# Patient Record
Sex: FEMALE | Race: WHITE | NOT HISPANIC OR LATINO | URBAN - METROPOLITAN AREA
[De-identification: names, ages, dates, MRNs, and addresses within clinical notes are randomized per-mention and may not be internally consistent; named-entity substitution may affect disease eponyms.]

---

## 2017-01-28 ENCOUNTER — EMERGENCY (EMERGENCY)
Facility: HOSPITAL | Age: 82
LOS: 1 days | End: 2017-01-28
Admitting: EMERGENCY MEDICINE
Payer: MEDICARE

## 2017-01-28 DIAGNOSIS — Z98.89 OTHER SPECIFIED POSTPROCEDURAL STATES: Chronic | ICD-10-CM

## 2017-01-28 PROCEDURE — 72110 X-RAY EXAM L-2 SPINE 4/>VWS: CPT | Mod: 26

## 2017-01-28 PROCEDURE — 70450 CT HEAD/BRAIN W/O DYE: CPT

## 2017-01-28 PROCEDURE — 99284 EMERGENCY DEPT VISIT MOD MDM: CPT

## 2017-01-28 PROCEDURE — 72110 X-RAY EXAM L-2 SPINE 4/>VWS: CPT

## 2017-01-28 PROCEDURE — 70450 CT HEAD/BRAIN W/O DYE: CPT | Mod: 26

## 2017-01-28 PROCEDURE — 99284 EMERGENCY DEPT VISIT MOD MDM: CPT | Mod: 25

## 2017-04-10 ENCOUNTER — INPATIENT (INPATIENT)
Facility: HOSPITAL | Age: 82
LOS: 2 days | Discharge: NURSING, REHAB OR LONG-TERM CARE | DRG: 535 | End: 2017-04-13
Attending: INTERNAL MEDICINE | Admitting: INTERNAL MEDICINE
Payer: MEDICARE

## 2017-04-10 VITALS
RESPIRATION RATE: 18 BRPM | TEMPERATURE: 98 F | DIASTOLIC BLOOD PRESSURE: 73 MMHG | OXYGEN SATURATION: 94 % | SYSTOLIC BLOOD PRESSURE: 168 MMHG | WEIGHT: 110.01 LBS | HEART RATE: 76 BPM | HEIGHT: 60 IN

## 2017-04-10 DIAGNOSIS — Z98.89 OTHER SPECIFIED POSTPROCEDURAL STATES: Chronic | ICD-10-CM

## 2017-04-10 DIAGNOSIS — S32.9XXA FRACTURE OF UNSPECIFIED PARTS OF LUMBOSACRAL SPINE AND PELVIS, INITIAL ENCOUNTER FOR CLOSED FRACTURE: ICD-10-CM

## 2017-04-10 DIAGNOSIS — Z86.39 PERSONAL HISTORY OF OTHER ENDOCRINE, NUTRITIONAL AND METABOLIC DISEASE: ICD-10-CM

## 2017-04-10 DIAGNOSIS — I10 ESSENTIAL (PRIMARY) HYPERTENSION: ICD-10-CM

## 2017-04-10 DIAGNOSIS — G30.0 ALZHEIMER'S DISEASE WITH EARLY ONSET: ICD-10-CM

## 2017-04-10 DIAGNOSIS — I47.1 SUPRAVENTRICULAR TACHYCARDIA: ICD-10-CM

## 2017-04-10 LAB
ALBUMIN SERPL ELPH-MCNC: 2.8 G/DL — LOW (ref 3.3–5)
ALP SERPL-CCNC: 76 U/L — SIGNIFICANT CHANGE UP (ref 30–120)
ALT FLD-CCNC: 18 U/L DA — SIGNIFICANT CHANGE UP (ref 10–60)
ANION GAP SERPL CALC-SCNC: 7 MMOL/L — SIGNIFICANT CHANGE UP (ref 5–17)
APPEARANCE UR: CLEAR — SIGNIFICANT CHANGE UP
APTT BLD: 31.8 SEC — SIGNIFICANT CHANGE UP (ref 27.5–37.4)
AST SERPL-CCNC: 20 U/L — SIGNIFICANT CHANGE UP (ref 10–40)
BASOPHILS # BLD AUTO: 0.1 K/UL — SIGNIFICANT CHANGE UP (ref 0–0.2)
BASOPHILS NFR BLD AUTO: 2.6 % — HIGH (ref 0–2)
BILIRUB SERPL-MCNC: 0.7 MG/DL — SIGNIFICANT CHANGE UP (ref 0.2–1.2)
BILIRUB UR-MCNC: ABNORMAL
BUN SERPL-MCNC: 21 MG/DL — SIGNIFICANT CHANGE UP (ref 7–23)
CALCIUM SERPL-MCNC: 8.8 MG/DL — SIGNIFICANT CHANGE UP (ref 8.4–10.5)
CHLORIDE SERPL-SCNC: 103 MMOL/L — SIGNIFICANT CHANGE UP (ref 96–108)
CO2 SERPL-SCNC: 28 MMOL/L — SIGNIFICANT CHANGE UP (ref 22–31)
COLOR SPEC: YELLOW — SIGNIFICANT CHANGE UP
CREAT SERPL-MCNC: 0.91 MG/DL — SIGNIFICANT CHANGE UP (ref 0.5–1.3)
DIFF PNL FLD: ABNORMAL
EOSINOPHIL # BLD AUTO: 0.2 K/UL — SIGNIFICANT CHANGE UP (ref 0–0.5)
EOSINOPHIL NFR BLD AUTO: 3.4 % — SIGNIFICANT CHANGE UP (ref 0–6)
GLUCOSE SERPL-MCNC: 145 MG/DL — HIGH (ref 70–99)
GLUCOSE UR QL: NEGATIVE MG/DL — SIGNIFICANT CHANGE UP
HCT VFR BLD CALC: 39.6 % — SIGNIFICANT CHANGE UP (ref 34.5–45)
HGB BLD-MCNC: 13.4 G/DL — SIGNIFICANT CHANGE UP (ref 11.5–15.5)
INR BLD: 1.17 RATIO — HIGH (ref 0.88–1.16)
KETONES UR-MCNC: NEGATIVE — SIGNIFICANT CHANGE UP
LEUKOCYTE ESTERASE UR-ACNC: ABNORMAL
LYMPHOCYTES # BLD AUTO: 0.5 K/UL — LOW (ref 1–3.3)
LYMPHOCYTES # BLD AUTO: 11.4 % — LOW (ref 13–44)
MCHC RBC-ENTMCNC: 33.7 PG — SIGNIFICANT CHANGE UP (ref 27–34)
MCHC RBC-ENTMCNC: 33.8 GM/DL — SIGNIFICANT CHANGE UP (ref 32–36)
MCV RBC AUTO: 99.6 FL — SIGNIFICANT CHANGE UP (ref 80–100)
MONOCYTES # BLD AUTO: 0.8 K/UL — SIGNIFICANT CHANGE UP (ref 0–0.9)
MONOCYTES NFR BLD AUTO: 16.6 % — HIGH (ref 2–14)
NEUTROPHILS # BLD AUTO: 3.1 K/UL — SIGNIFICANT CHANGE UP (ref 1.8–7.4)
NEUTROPHILS NFR BLD AUTO: 65.9 % — SIGNIFICANT CHANGE UP (ref 43–77)
NITRITE UR-MCNC: NEGATIVE — SIGNIFICANT CHANGE UP
PH UR: 5 — SIGNIFICANT CHANGE UP (ref 4.8–8)
PLATELET # BLD AUTO: 177 K/UL — SIGNIFICANT CHANGE UP (ref 150–400)
POTASSIUM SERPL-MCNC: 4.7 MMOL/L — SIGNIFICANT CHANGE UP (ref 3.5–5.3)
POTASSIUM SERPL-SCNC: 4.7 MMOL/L — SIGNIFICANT CHANGE UP (ref 3.5–5.3)
PROT SERPL-MCNC: 6.6 G/DL — SIGNIFICANT CHANGE UP (ref 6–8.3)
PROT UR-MCNC: 30 MG/DL
PROTHROM AB SERPL-ACNC: 12.8 SEC — HIGH (ref 9.8–12.7)
RBC # BLD: 3.98 M/UL — SIGNIFICANT CHANGE UP (ref 3.8–5.2)
RBC # FLD: 12.5 % — SIGNIFICANT CHANGE UP (ref 10.3–14.5)
SODIUM SERPL-SCNC: 138 MMOL/L — SIGNIFICANT CHANGE UP (ref 135–145)
SP GR SPEC: 1.02 — SIGNIFICANT CHANGE UP (ref 1.01–1.02)
T3 SERPL-MCNC: 88 NG/DL — SIGNIFICANT CHANGE UP (ref 80–200)
T4 AB SER-ACNC: 7.2 UG/DL — SIGNIFICANT CHANGE UP (ref 4.6–12)
TSH SERPL-MCNC: 1.03 UIU/ML — SIGNIFICANT CHANGE UP (ref 0.27–4.2)
UROBILINOGEN FLD QL: 1 MG/DL
WBC # BLD: 4.7 K/UL — SIGNIFICANT CHANGE UP (ref 3.8–10.5)
WBC # FLD AUTO: 4.7 K/UL — SIGNIFICANT CHANGE UP (ref 3.8–10.5)

## 2017-04-10 PROCEDURE — 93010 ELECTROCARDIOGRAM REPORT: CPT

## 2017-04-10 PROCEDURE — 70450 CT HEAD/BRAIN W/O DYE: CPT | Mod: 26

## 2017-04-10 PROCEDURE — 99284 EMERGENCY DEPT VISIT MOD MDM: CPT

## 2017-04-10 PROCEDURE — 71010: CPT | Mod: 26

## 2017-04-10 PROCEDURE — 73502 X-RAY EXAM HIP UNI 2-3 VIEWS: CPT | Mod: 26,LT

## 2017-04-10 PROCEDURE — 72170 X-RAY EXAM OF PELVIS: CPT | Mod: 26,59

## 2017-04-10 RX ORDER — LEVOTHYROXINE SODIUM 125 MCG
50 TABLET ORAL DAILY
Qty: 0 | Refills: 0 | Status: DISCONTINUED | OUTPATIENT
Start: 2017-04-10 | End: 2017-04-13

## 2017-04-10 RX ORDER — VALSARTAN 80 MG/1
80 TABLET ORAL DAILY
Qty: 0 | Refills: 0 | Status: DISCONTINUED | OUTPATIENT
Start: 2017-04-10 | End: 2017-04-13

## 2017-04-10 RX ORDER — ACETAMINOPHEN 500 MG
650 TABLET ORAL EVERY 6 HOURS
Qty: 0 | Refills: 0 | Status: DISCONTINUED | OUTPATIENT
Start: 2017-04-10 | End: 2017-04-13

## 2017-04-10 RX ORDER — METOPROLOL TARTRATE 50 MG
12.5 TABLET ORAL
Qty: 0 | Refills: 0 | Status: DISCONTINUED | OUTPATIENT
Start: 2017-04-10 | End: 2017-04-13

## 2017-04-10 RX ORDER — ESCITALOPRAM OXALATE 10 MG/1
10 TABLET, FILM COATED ORAL DAILY
Qty: 0 | Refills: 0 | Status: DISCONTINUED | OUTPATIENT
Start: 2017-04-10 | End: 2017-04-13

## 2017-04-10 RX ORDER — ALBUTEROL 90 UG/1
2.5 AEROSOL, METERED ORAL ONCE
Qty: 0 | Refills: 0 | Status: COMPLETED | OUTPATIENT
Start: 2017-04-10 | End: 2017-04-10

## 2017-04-10 RX ORDER — ASPIRIN/CALCIUM CARB/MAGNESIUM 324 MG
81 TABLET ORAL DAILY
Qty: 0 | Refills: 0 | Status: DISCONTINUED | OUTPATIENT
Start: 2017-04-10 | End: 2017-04-13

## 2017-04-10 RX ORDER — MORPHINE SULFATE 50 MG/1
2 CAPSULE, EXTENDED RELEASE ORAL EVERY 6 HOURS
Qty: 0 | Refills: 0 | Status: DISCONTINUED | OUTPATIENT
Start: 2017-04-10 | End: 2017-04-13

## 2017-04-10 RX ORDER — ENOXAPARIN SODIUM 100 MG/ML
30 INJECTION SUBCUTANEOUS DAILY
Qty: 0 | Refills: 0 | Status: DISCONTINUED | OUTPATIENT
Start: 2017-04-10 | End: 2017-04-13

## 2017-04-10 RX ORDER — SODIUM CHLORIDE 9 MG/ML
3 INJECTION INTRAMUSCULAR; INTRAVENOUS; SUBCUTANEOUS EVERY 8 HOURS
Qty: 0 | Refills: 0 | Status: DISCONTINUED | OUTPATIENT
Start: 2017-04-10 | End: 2017-04-13

## 2017-04-10 RX ADMIN — SODIUM CHLORIDE 3 MILLILITER(S): 9 INJECTION INTRAMUSCULAR; INTRAVENOUS; SUBCUTANEOUS at 22:24

## 2017-04-10 RX ADMIN — ALBUTEROL 2.5 MILLIGRAM(S): 90 AEROSOL, METERED ORAL at 22:47

## 2017-04-10 RX ADMIN — ESCITALOPRAM OXALATE 10 MILLIGRAM(S): 10 TABLET, FILM COATED ORAL at 15:10

## 2017-04-10 RX ADMIN — Medication 1 TABLET(S): at 15:11

## 2017-04-10 RX ADMIN — Medication 12.5 MILLIGRAM(S): at 17:49

## 2017-04-10 RX ADMIN — ENOXAPARIN SODIUM 30 MILLIGRAM(S): 100 INJECTION SUBCUTANEOUS at 17:48

## 2017-04-10 RX ADMIN — Medication 650 MILLIGRAM(S): at 15:22

## 2017-04-10 RX ADMIN — SODIUM CHLORIDE 3 MILLILITER(S): 9 INJECTION INTRAMUSCULAR; INTRAVENOUS; SUBCUTANEOUS at 15:26

## 2017-04-10 RX ADMIN — Medication 50 MICROGRAM(S): at 15:11

## 2017-04-10 RX ADMIN — VALSARTAN 80 MILLIGRAM(S): 80 TABLET ORAL at 15:18

## 2017-04-10 RX ADMIN — Medication 650 MILLIGRAM(S): at 16:20

## 2017-04-10 RX ADMIN — Medication 81 MILLIGRAM(S): at 15:10

## 2017-04-10 NOTE — ED ADULT NURSE REASSESSMENT NOTE - NS ED NURSE REASSESS COMMENT FT1
11am- Pt resting quietly on stretcher. Family bedside. Admitted to Dr Grimaldo.    1130 -Dr Grimaldo in attendance.

## 2017-04-10 NOTE — CONSULT NOTE ADULT - ASSESSMENT
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    Plan:  - Continue metoprolol 12.5 mg bid for SVT  - Continue valsartan 80 mg daily for HTN  - Ortho evaluation  - The family had previously declined intervention for her aortic stenosis. She is currently asymptomatic from an aortic stenosis standpoint. If orthopedic surgery required, the patient is at high risk for cardiac issues. However, no further cardiac testing indicated at this time.

## 2017-04-10 NOTE — ED PROVIDER NOTE - CARE PLAN
Principal Discharge DX:	Closed nondisplaced fracture of pelvis, unspecified part of pelvis, initial encounter

## 2017-04-10 NOTE — ED PROVIDER NOTE - PMH
Acquired hypothyroidism    Alzheimer's disease, early onset    chronic leukopenia    Dementia    Gastritis    History of hypothyroidism    Hypertension    Hypertension    IBS (irritable bowel syndrome)    Murmur, cardiac    Nerve pain    PAD (peripheral artery disease)

## 2017-04-10 NOTE — ED PROVIDER NOTE - MEDICAL DECISION MAKING DETAILS
Evaluate the severity and injury from the fall by ordering imaging studies then corollate clinically

## 2017-04-10 NOTE — H&P ADULT - ASSESSMENT
89yoF h/o mod dementia, AS, HTN, SVT, recurrent falls,rec UTI, with syncope with SVT, was advised PPM on earlier admission and family refused , hyperthyroidism , L2 compression fracture.  Patient lives on her own, ambulates with walker, independent with ADLs and with great support from her daughter and 24 hrs AID   .  patient fell yesterday and having hip pain, no syncope no seizure had vomiting twice last evening,had hip pain and was unable to ambulate so brought in to [facility] ED and found to have HIP fracture and is being admitted for further care and ortho consult, called and awaiting f up.   no bowel, or urinary complaint

## 2017-04-10 NOTE — H&P ADULT - NEUROLOGICAL DETAILS
sensation intact/responds to verbal commands/responds to pain/deep reflexes intact/cranial nerves intact

## 2017-04-10 NOTE — CONSULT NOTE ADULT - SUBJECTIVE AND OBJECTIVE BOX
History of Present Illness: The patient is a 89 year old female with a history of HTN, low-flow low-gradient severe aortic stenosis with preserved EF, dementia, prior SVT, hypothyroidism who presents after fall. History obtained from family member. The patient was walking with her walker when she had a mechanical fall. There was no loss of consciousness, dizziness, palpitations, chest pain, shortness of breath. She was noted to have a hip fracture in the ED.    Past Medical/Surgical History: HTN, aortic stenosis, dementia, prior SVT, hyperthyroidism    Medications:    Family History: Non-contributory family history of premature cardiovascular atherosclerotic disease    Social History: No tobacco, alcohol or drug use    Review of Systems:  General: No fevers, chills, weight loss or gain  Skin: No rashes, color changes  Cardiovascular: No chest pain, orthopnea  Respiratory: No shortness of breath, cough  Gastrointestinal: No nausea, abdominal pain  Genitourinary: No incontinence, pain with urination  Musculoskeletal: No pain, swelling, decreased range of motion  Neurological: No headache, weakness  Psychiatric: No depression, anxiety  Endocrine: No weight loss or gain, increased thirst  All other systems are comprehensively negative.    Physical Exam:  Vitals:        Vital Signs Last 24 Hrs  T(C): 36.3, Max: 36.7 (04-10 @ 09:03)  T(F): 97.3, Max: 98 (04-10 @ 09:03)  HR: 75 (71 - 76)  BP: 124/86 (110/61 - 168/73)  BP(mean): --  RR: 16 (16 - 18)  SpO2: 95% (94% - 98%)  General: NAD  Neck: No JVD, no carotid bruit  Lungs: CTAB  CV: RRR, nl S1/S2, 2/6 systolic murmur  Abdomen: S/NT/ND, +BS  Extremities: No LE edema, no cyanosis    Labs:                        13.4   4.7   )-----------( 177      ( 10 Apr 2017 10:04 )             39.6     04-10    138  |  103  |  21  ----------------------------<  145<H>  4.7   |  28  |  0.91    Ca    8.8      10 Apr 2017 10:04    TPro  6.6  /  Alb  2.8<L>  /  TBili  0.7  /  DBili  x   /  AST  20  /  ALT  18  /  AlkPhos  76  04-10        PT/INR - ( 10 Apr 2017 10:04 )   PT: 12.8 sec;   INR: 1.17 ratio         PTT - ( 10 Apr 2017 10:04 )  PTT:31.8 sec    ECG: History of Present Illness: The patient is a 89 year old female with a history of HTN, low-flow low-gradient severe aortic stenosis with preserved EF, dementia, prior SVT, hypothyroidism who presents after fall. History obtained from family member. The patient was walking with her walker when she had a mechanical fall. There was no loss of consciousness, dizziness, palpitations, chest pain, shortness of breath. She was noted to have a hip fracture in the ED.    Past Medical/Surgical History: HTN, aortic stenosis, dementia, prior SVT, hyperthyroidism    Medications:  MEDICATIONS  (STANDING):  sodium chloride 0.9% lock flush 3milliLiter(s) IV Push every 8 hours  enoxaparin Injectable 30milliGRAM(s) SubCutaneous daily  aspirin  chewable 81milliGRAM(s) Oral daily  valsartan 80milliGRAM(s) Oral daily  escitalopram 10milliGRAM(s) Oral daily  metoprolol 12.5milliGRAM(s) Oral two times a day  levothyroxine 50MICROGram(s) Oral daily  calcium carbonate  625 mG + Vitamin D (OsCal 250 + D) 1Tablet(s) Oral daily    MEDICATIONS  (PRN):  acetaminophen   Tablet. 650milliGRAM(s) Oral every 6 hours PRN Moderate Pain (4 - 6)  morphine  - Injectable 2milliGRAM(s) IV Push every 6 hours PRN Severe Pain (7 - 10)    Family History: Non-contributory family history of premature cardiovascular atherosclerotic disease    Social History: No tobacco, alcohol or drug use    Review of Systems:  General: No fevers, chills, weight loss or gain  Skin: No rashes, color changes  Cardiovascular: No chest pain, orthopnea  Respiratory: No shortness of breath, cough  Gastrointestinal: No nausea, abdominal pain  Genitourinary: No incontinence, pain with urination  Musculoskeletal: No pain, swelling, decreased range of motion  Neurological: No headache, weakness  Psychiatric: No depression, anxiety  Endocrine: No weight loss or gain, increased thirst  All other systems are comprehensively negative.    Physical Exam:  Vitals:        Vital Signs Last 24 Hrs  T(C): 36.3, Max: 36.7 (04-10 @ 09:03)  T(F): 97.3, Max: 98 (04-10 @ 09:03)  HR: 75 (71 - 76)  BP: 124/86 (110/61 - 168/73)  BP(mean): --  RR: 16 (16 - 18)  SpO2: 95% (94% - 98%)  General: NAD  Neck: No JVD, no carotid bruit  Lungs: CTAB  CV: RRR, nl S1/S2, 2/6 systolic murmur  Abdomen: S/NT/ND, +BS  Extremities: No LE edema, no cyanosis    Labs:                        13.4   4.7   )-----------( 177      ( 10 Apr 2017 10:04 )             39.6     04-10    138  |  103  |  21  ----------------------------<  145<H>  4.7   |  28  |  0.91    Ca    8.8      10 Apr 2017 10:04    TPro  6.6  /  Alb  2.8<L>  /  TBili  0.7  /  DBili  x   /  AST  20  /  ALT  18  /  AlkPhos  76  04-10        PT/INR - ( 10 Apr 2017 10:04 )   PT: 12.8 sec;   INR: 1.17 ratio         PTT - ( 10 Apr 2017 10:04 )  PTT:31.8 sec    ECG: NSR, normal axis, LVH with secondary repol abnormality

## 2017-04-10 NOTE — H&P ADULT - HISTORY OF PRESENT ILLNESS
89yoF hx  mod dementia, AS, HTN, SVT, recurrent falls,rec UTI, with syncope with SVT, was advised PPM on earlier admission and family refused , hyperthyroidism , L2 compression fracture.  Patient lives on her own, ambulates with walker, independent with ADLs and with great support from her daughter and 24 hrs AID   .  patient fell yesterday and having hip pain, no syncope no seizure had vomiting twice last evening,had hip pain and was unable to ambulate so brought in to o ED and found to have HIP fracture and is being admitted for further care and ortho consult, called and awaiting f up.   no bowel, or urinary complaint

## 2017-04-10 NOTE — H&P ADULT - ATTENDING COMMENTS
I have discussed care plan with patient and HCP,expressed understanding of problems treatment and their effect and side effects, alternatives in detail,I have asked if they have any questions and concerns and appropriately addressed them to best of my ability  Reviewed all diagonostic tests, lab results and drug drug interactions, and medications  120 minutes spent on this visit, 50% visit time spent in care co-ordination with other attendings , family, and counselling patient

## 2017-04-10 NOTE — PATIENT PROFILE ADULT. - NS PRO AD PATIENT TYPE
Medical Orders for Life-Sustaining Treatment (MOLST) Health Care Proxy (HCP)/Medical Orders for Life-Sustaining Treatment (MOLST)

## 2017-04-10 NOTE — PATIENT PROFILE ADULT. - NS PRO AD PATIENT TYPE ON CHART
Medical Orders for Life-Sustaining Treatment (MOLST) Health Care Proxy (HCP)/Medical Orders for Life-Sustaining Treatment (MOLST)/Do Not Resuscitate (DNR)

## 2017-04-10 NOTE — ED PROVIDER NOTE - PSH
Cataract of right eye    H/O inguinal hernia repair    H/O umbilical hernia repair  six years ago  H/O umbilical hernia repair

## 2017-04-10 NOTE — ED PROVIDER NOTE - OBJECTIVE STATEMENT
Patient came in by ambulance from home apparently fell on the floor yesterday care taker put her to bed decided to call ambulance this morning for evaluation. Paramedic apparently found patient in bed with some hip discomfort. No history of lost of consciousness.

## 2017-04-10 NOTE — H&P ADULT - PMH
Acquired hypothyroidism    Alzheimer's disease, early onset    chronic leukopenia    Compression fracture of L2    Dementia    Gastritis    History of hypothyroidism    Hypertension    Hypertension    IBS (irritable bowel syndrome)    Murmur, cardiac    Nerve pain    PAD (peripheral artery disease)    SVT (supraventricular tachycardia)

## 2017-04-11 LAB
ANION GAP SERPL CALC-SCNC: 5 MMOL/L — SIGNIFICANT CHANGE UP (ref 5–17)
BUN SERPL-MCNC: 29 MG/DL — HIGH (ref 7–23)
CALCIUM SERPL-MCNC: 9.2 MG/DL — SIGNIFICANT CHANGE UP (ref 8.4–10.5)
CHLORIDE SERPL-SCNC: 103 MMOL/L — SIGNIFICANT CHANGE UP (ref 96–108)
CHOLEST SERPL-MCNC: 138 MG/DL — SIGNIFICANT CHANGE UP (ref 10–199)
CO2 SERPL-SCNC: 30 MMOL/L — SIGNIFICANT CHANGE UP (ref 22–31)
CREAT SERPL-MCNC: 1.18 MG/DL — SIGNIFICANT CHANGE UP (ref 0.5–1.3)
GLUCOSE SERPL-MCNC: 147 MG/DL — HIGH (ref 70–99)
HCT VFR BLD CALC: 34.7 % — SIGNIFICANT CHANGE UP (ref 34.5–45)
HDLC SERPL-MCNC: 27 MG/DL — LOW (ref 40–125)
HGB BLD-MCNC: 11.1 G/DL — LOW (ref 11.5–15.5)
LIPID PNL WITH DIRECT LDL SERPL: 83 MG/DL — SIGNIFICANT CHANGE UP
MCHC RBC-ENTMCNC: 31.9 GM/DL — LOW (ref 32–36)
MCHC RBC-ENTMCNC: 32.2 PG — SIGNIFICANT CHANGE UP (ref 27–34)
MCV RBC AUTO: 101.1 FL — HIGH (ref 80–100)
PLATELET # BLD AUTO: 134 K/UL — LOW (ref 150–400)
POTASSIUM SERPL-MCNC: 4.8 MMOL/L — SIGNIFICANT CHANGE UP (ref 3.5–5.3)
POTASSIUM SERPL-SCNC: 4.8 MMOL/L — SIGNIFICANT CHANGE UP (ref 3.5–5.3)
RBC # BLD: 3.44 M/UL — LOW (ref 3.8–5.2)
RBC # FLD: 12.6 % — SIGNIFICANT CHANGE UP (ref 10.3–14.5)
SODIUM SERPL-SCNC: 138 MMOL/L — SIGNIFICANT CHANGE UP (ref 135–145)
TOTAL CHOLESTEROL/HDL RATIO MEASUREMENT: 5.1 RATIO — SIGNIFICANT CHANGE UP (ref 3.3–7.1)
TRIGL SERPL-MCNC: 140 MG/DL — SIGNIFICANT CHANGE UP (ref 10–149)
WBC # BLD: 2.8 K/UL — LOW (ref 3.8–10.5)
WBC # FLD AUTO: 2.8 K/UL — LOW (ref 3.8–10.5)

## 2017-04-11 PROCEDURE — 99222 1ST HOSP IP/OBS MODERATE 55: CPT | Mod: 24

## 2017-04-11 PROCEDURE — 27197 CLSD TX PELVIC RING FX: CPT | Mod: LT

## 2017-04-11 RX ORDER — LIDOCAINE 4 G/100G
1 CREAM TOPICAL DAILY
Qty: 0 | Refills: 0 | Status: DISCONTINUED | OUTPATIENT
Start: 2017-04-11 | End: 2017-04-13

## 2017-04-11 RX ORDER — ALBUTEROL 90 UG/1
1 AEROSOL, METERED ORAL EVERY 4 HOURS
Qty: 0 | Refills: 0 | Status: DISCONTINUED | OUTPATIENT
Start: 2017-04-11 | End: 2017-04-13

## 2017-04-11 RX ORDER — IPRATROPIUM/ALBUTEROL SULFATE 18-103MCG
3 AEROSOL WITH ADAPTER (GRAM) INHALATION EVERY 6 HOURS
Qty: 0 | Refills: 0 | Status: DISCONTINUED | OUTPATIENT
Start: 2017-04-11 | End: 2017-04-13

## 2017-04-11 RX ORDER — TIOTROPIUM BROMIDE 18 UG/1
1 CAPSULE ORAL; RESPIRATORY (INHALATION) DAILY
Qty: 0 | Refills: 0 | Status: DISCONTINUED | OUTPATIENT
Start: 2017-04-11 | End: 2017-04-13

## 2017-04-11 RX ORDER — SODIUM CHLORIDE 9 MG/ML
1000 INJECTION, SOLUTION INTRAVENOUS
Qty: 0 | Refills: 0 | Status: DISCONTINUED | OUTPATIENT
Start: 2017-04-11 | End: 2017-04-12

## 2017-04-11 RX ADMIN — Medication 50 MICROGRAM(S): at 06:13

## 2017-04-11 RX ADMIN — Medication 3 MILLILITER(S): at 19:24

## 2017-04-11 RX ADMIN — ENOXAPARIN SODIUM 30 MILLIGRAM(S): 100 INJECTION SUBCUTANEOUS at 12:46

## 2017-04-11 RX ADMIN — Medication 650 MILLIGRAM(S): at 18:36

## 2017-04-11 RX ADMIN — VALSARTAN 80 MILLIGRAM(S): 80 TABLET ORAL at 06:13

## 2017-04-11 RX ADMIN — Medication 3 MILLILITER(S): at 12:36

## 2017-04-11 RX ADMIN — LIDOCAINE 1 PATCH: 4 CREAM TOPICAL at 12:47

## 2017-04-11 RX ADMIN — SODIUM CHLORIDE 75 MILLILITER(S): 9 INJECTION, SOLUTION INTRAVENOUS at 12:43

## 2017-04-11 RX ADMIN — SODIUM CHLORIDE 3 MILLILITER(S): 9 INJECTION INTRAMUSCULAR; INTRAVENOUS; SUBCUTANEOUS at 12:47

## 2017-04-11 RX ADMIN — Medication 1 TABLET(S): at 12:46

## 2017-04-11 RX ADMIN — Medication 12.5 MILLIGRAM(S): at 18:36

## 2017-04-11 RX ADMIN — SODIUM CHLORIDE 3 MILLILITER(S): 9 INJECTION INTRAMUSCULAR; INTRAVENOUS; SUBCUTANEOUS at 06:13

## 2017-04-11 RX ADMIN — Medication 12.5 MILLIGRAM(S): at 06:13

## 2017-04-11 RX ADMIN — SODIUM CHLORIDE 3 MILLILITER(S): 9 INJECTION INTRAMUSCULAR; INTRAVENOUS; SUBCUTANEOUS at 21:11

## 2017-04-11 RX ADMIN — Medication 650 MILLIGRAM(S): at 10:13

## 2017-04-11 RX ADMIN — Medication 81 MILLIGRAM(S): at 12:46

## 2017-04-11 RX ADMIN — ESCITALOPRAM OXALATE 10 MILLIGRAM(S): 10 TABLET, FILM COATED ORAL at 12:47

## 2017-04-11 RX ADMIN — SODIUM CHLORIDE 75 MILLILITER(S): 9 INJECTION, SOLUTION INTRAVENOUS at 21:14

## 2017-04-11 RX ADMIN — Medication 650 MILLIGRAM(S): at 11:00

## 2017-04-11 NOTE — DIETITIAN INITIAL EVALUATION ADULT. - OTHER INFO
89F adm s/p fall, found to have pelvic fx. Pt's granddaughter and daughter at bedside, appear very involved c pt care. Pt noted to live alone but has 24hr aide. Pt presently on [facility] soft diet c adequate po intake per granddaughter. SLP consult pending as pt c hx of dysphagia per family. Granddaughter states that pt sometimes will cough at meals as she sometimes slouches down when eating and takes too big of bites. Family reports pt is usually a good eater given advanced age (3 meals/day, balanced meals- protein source, loves vegetables). Lactose allergy noted- however, grandaughter states pt tolerates pudding but straight liquid milk causes loose stool for pt. Wt stable pta- UBW ~120#, 5 feet tall. Appears adequately nourished given advanced age. Food preferences obtained to optimize po intake. Skin: L heel stage I; lemuel score 14.

## 2017-04-11 NOTE — DIETITIAN INITIAL EVALUATION ADULT. - SIGNS/SYMPTOMS
as evidenced by [facility] soft diet, adv age, SLP consult, r/o coughing c meals, large bites, sloughing

## 2017-04-11 NOTE — DIETITIAN INITIAL EVALUATION ADULT. - NUTRITIONGOAL OUTCOME1
pt to tolerate diet consistency and advance or remain on current consistency per SLP;po>65% at meals

## 2017-04-11 NOTE — PROGRESS NOTE ADULT - SUBJECTIVE AND OBJECTIVE BOX
Patient is a 89y old  Female who presents with a chief complaint of fell at home fx left pelvis (10 Apr 2017 15:03)      INTERVAL HPI/OVERNIGHT EVENTS:no events overnight.    MEDICATIONS  (STANDING):  sodium chloride 0.9% lock flush 3milliLiter(s) IV Push every 8 hours  enoxaparin Injectable 30milliGRAM(s) SubCutaneous daily  aspirin  chewable 81milliGRAM(s) Oral daily  valsartan 80milliGRAM(s) Oral daily  escitalopram 10milliGRAM(s) Oral daily  metoprolol 12.5milliGRAM(s) Oral two times a day  levothyroxine 50MICROGram(s) Oral daily  calcium carbonate  625 mG + Vitamin D (OsCal 250 + D) 1Tablet(s) Oral daily    MEDICATIONS  (PRN):  acetaminophen   Tablet. 650milliGRAM(s) Oral every 6 hours PRN Moderate Pain (4 - 6)  morphine  - Injectable 2milliGRAM(s) IV Push every 6 hours PRN Severe Pain (7 - 10)      Allergies    Allergy Status Unknown    Intolerances          Vital Signs Last 24 Hrs  T(C): 37.3, Max: 37.3 ( @ 07:28)  T(F): 99.1, Max: 99.1 ( @ 07:28)  HR: 85 (71 - 85)  BP: 142/80 (110/61 - 142/80)  BP(mean): --  RR: 16 (16 - 16)  SpO2: 94% (93% - 98%)    LABS:                        11.1   2.8   )-----------( 134      ( 2017 06:32 )             34.7     -11    138  |  103  |  29<H>  ----------------------------<  147<H>  4.8   |  30  |  1.18    Ca    9.2      2017 06:32    TPro  6.6  /  Alb  2.8<L>  /  TBili  0.7  /  DBili  x   /  AST  20  /  ALT  18  /  AlkPhos  76  04-10    PT/INR - ( 10 Apr 2017 10:04 )   PT: 12.8 sec;   INR: 1.17 ratio         PTT - ( 10 Apr 2017 10:04 )  PTT:31.8 sec  Urinalysis Basic - ( 10 Apr 2017 22:40 )    Color: Yellow / Appearance: Clear / S.020 / pH: x  Gluc: x / Ketone: Negative  / Bili: Small / Urobili: 1 mg/dL   Blood: x / Protein: 30 mg/dL / Nitrite: Negative   Leuk Esterase: Moderate / RBC: 0-2 /HPF / WBC 11-25   Sq Epi: x / Non Sq Epi: Occasional / Bacteria: Moderate      CAPILLARY BLOOD GLUCOSE      RADIOLOGY & ADDITIONAL TESTS:    Imaging Personally Reviewed:  [ ] YES  [ ] NO    Consultant(s) Notes Reviewed:  [ ] YES  [ ] NO    Care Discussed with Consultants/Other Providers [ ] YES  [ ] NO

## 2017-04-11 NOTE — CONSULT NOTE ADULT - SUBJECTIVE AND OBJECTIVE BOX
Orthopedic Consult Note  History of Present Illness:  Chief Complaint/Reason for Admission: s/p fall with fracture pelvis , pain in hip	  History of Present Illness	  89yoF hx  mod dementia, AS, HTN, SVT, recurrent falls,rec UTI, with syncope with SVT, hyperthyroidism , L2 compression fracture.  Patient lives on her own, ambulates with walker, independent with ADLs and with great support from her daughter and 24 hrs AID   .  Hx of fall which resulted in left hip pain, no syncope no seizure. was unable to ambulate so brought in to o ED.  Xrays of pelvis/left hip + for superior and inferior pubic rami fractures. admitted for further care.   no bowel, or urinary complaint    Review of Systems:  · Negative General Symptoms	no fever; no chills	  · General Symptoms	malaise	  · Skin/Breast	negative	  · Ophthalmologic	negative	  · ENMT	negative	  · Negative Respiratory and Thorax Symptoms	no dyspnea; no cough	  · Negative Cardiovascular Symptoms	no chest pain; no palpitations	  · Gastrointestinal	negative	  · Genitourinary	negative	  · Musculoskeletal Comments	pain in hip	  · Neurological Symptoms	confusion	  · Psychiatric	negative	  · Hematology/Lymphatics	negative	      Allergies and Intolerances:        Allergies:  	No Known Allergies:     Home Medications:   * Patient Currently Takes Medications as of 10-Apr-2017 09:12 documented in Prescription Writer  · 	metoprolol: 12.5 milligram(s) orally 2 times a day, Last Dose Taken:    · 	calcium (as carbonate)-vitamin D 250 mg-125 intl units oral tablet: 1 tab(s) orally once a day  · 	acetaminophen 325 mg oral tablet: 2 tab(s) orally every 6 hours  · 	valsartan 80 mg oral tablet: 1 tab(s) orally 2 times a day, Last Dose Taken:    · 	escitalopram 10 mg oral tablet: 1 tab(s) orally once a day, Last Dose Taken:    · 	levothyroxine 50 mcg (0.05 mg) oral tablet: 1 tab(s) orally once a day, Last Dose Taken:    · 	aspirin 81 mg oral tablet: 1 tab(s) orally once a day  · 	Fosamax 70 mg oral tablet: 1 tab(s) orally once a week, Last Dose Taken:      . .    Patient History:   Past Medical History:  Acquired hypothyroidism    Alzheimer's disease, early onset    chronic leukopenia    Compression fracture of L2    Dementia    Gastritis    History of hypothyroidism    Hypertension    Hypertension    IBS (irritable bowel syndrome)    Murmur, cardiac    Nerve pain    PAD (peripheral artery disease)    SVT (supraventricular tachycardia).    Past Surgical History:  H/O inguinal hernia repair    H/O umbilical hernia repair.    Family History:  No pertinent family history in first degree relatives.    Tobacco Screening:  · Core Measure Site	No	    Risk Assessment:   Present on Admission:  Deep Venous Thrombosis	no	  Pulmonary Embolus	no	    Heart Failure:  Does this patient have a history of or has been diagnosed with heart failure? no.      Physical Exam:  Orthopedic Exam left hip:  +left hip (groin) ttp  mild associated edema  neg deformity, crepitus, ecchymosis, erythema  decreased active and passive left hip ROM left hip in all planes due to pain  grossly stable to stress  grossly nvid BLE  unable to SLR LLE due to pain  Note: exam somewhat limited due to patient's age, dementia    · Constitutional	detailed exam	  · Constitutional Details	no distress	  · Eyes	detailed exam	  · Eyes Details	PERRL; EOMI	  · ENMT	No oral lesions; no gross abnormalities	  · Neck	No bruits; no thyromegaly or nodules	  · Breasts	No masses; no nipple discharge	  · Back	No deformity or limitation of movement	  · Respiratory	detailed exam	  · Respiratory Details	airway patent; breath sounds equal; good air movement; respirations non-labored; clear to auscultation bilaterally	  · Cardiovascular	detailed exam	  · Cardiovascular Details	regular rate and rhythm	  · Cardiovascular Details	positive S1; positive S2	  · Gastrointestinal	detailed exam	  · GI Normal	soft; nontender; bowel sounds normal	  · Genitourinary	detailed exam	  · Genitourinary Details Female	normal external genitalia	  · Rectal	patient refused	  · Extremities	detailed exam	  · Extremities Comments	rom decreased	  · Vascular	Equal and normal pulses (carotid, femoral, dorsalis pedis)	  · Neurological	detailed exam	  · Neurological Details	responds to pain; responds to verbal commands; sensation intact; deep reflexes intact; cranial nerves intact	  · Mental Status	confused poor memory	  · Skin	detailed exam	  · Skin Details	warm and dry	    Assessment and Plan:   Assessment:  · Assessment		  89yoF with left hip superior and inferior pubic rami fractures.     Plan: nonop treatment. pain management. WBAT LLE. Physical therapy/rehab consult. Case Management/Social Work for disposition planning (e.g. rehab vs subacute rehab vs home with VNS). f/up in office in approx 2 weeks and/or prn otherwise.

## 2017-04-11 NOTE — PROGRESS NOTE ADULT - SUBJECTIVE AND OBJECTIVE BOX
Chief Complaint: Fall    Interval Events: No events overnight. Non-operative management of fracture.    Review of Systems:  General: No fevers, chills, weight loss or gain  Skin: No rashes, color changes  Cardiovascular: No chest pain, orthopnea  Respiratory: No shortness of breath, cough  Gastrointestinal: No nausea, abdominal pain  Genitourinary: No incontinence, pain with urination  Musculoskeletal: No pain, swelling, decreased range of motion  Neurological: No headache, weakness  Psychiatric: No depression, anxiety  Endocrine: No weight loss or gain, increased thirst  All other systems are comprehensively negative.    Physical Exam:  Vitals:        Vital Signs Last 24 Hrs  T(C): 37.3, Max: 37.3 (04-11 @ 07:28)  T(F): 99.1, Max: 99.1 (04-11 @ 07:28)  HR: 85 (74 - 85)  BP: 142/80 (115/64 - 142/80)  BP(mean): --  RR: 16 (16 - 16)  SpO2: 94% (93% - 95%)  General: NAD  Neck: No JVD, no carotid bruit  Lungs: CTAB  CV: RRR, nl S1/S2, no M/R/G  Abdomen: S/NT/ND, +BS  Extremities: No LE edema, no cyanosis    Labs:                        11.1   2.8   )-----------( 134      ( 11 Apr 2017 06:32 )             34.7     04-11    138  |  103  |  29<H>  ----------------------------<  147<H>  4.8   |  30  |  1.18    Ca    9.2      11 Apr 2017 06:32    TPro  6.6  /  Alb  2.8<L>  /  TBili  0.7  /  DBili  x   /  AST  20  /  ALT  18  /  AlkPhos  76  04-10        PT/INR - ( 10 Apr 2017 10:04 )   PT: 12.8 sec;   INR: 1.17 ratio         PTT - ( 10 Apr 2017 10:04 )  PTT:31.8 sec

## 2017-04-12 LAB
ANION GAP SERPL CALC-SCNC: 4 MMOL/L — LOW (ref 5–17)
BUN SERPL-MCNC: 23 MG/DL — SIGNIFICANT CHANGE UP (ref 7–23)
CALCIUM SERPL-MCNC: 9.3 MG/DL — SIGNIFICANT CHANGE UP (ref 8.4–10.5)
CHLORIDE SERPL-SCNC: 106 MMOL/L — SIGNIFICANT CHANGE UP (ref 96–108)
CO2 SERPL-SCNC: 31 MMOL/L — SIGNIFICANT CHANGE UP (ref 22–31)
CREAT SERPL-MCNC: 0.92 MG/DL — SIGNIFICANT CHANGE UP (ref 0.5–1.3)
GLUCOSE SERPL-MCNC: 164 MG/DL — HIGH (ref 70–99)
HCT VFR BLD CALC: 32.4 % — LOW (ref 34.5–45)
HGB BLD-MCNC: 10.5 G/DL — LOW (ref 11.5–15.5)
IRON SATN MFR SERPL: 10 % — LOW (ref 14–50)
IRON SATN MFR SERPL: 18 UG/DL — LOW (ref 30–160)
MCHC RBC-ENTMCNC: 32.3 GM/DL — SIGNIFICANT CHANGE UP (ref 32–36)
MCHC RBC-ENTMCNC: 32.9 PG — SIGNIFICANT CHANGE UP (ref 27–34)
MCV RBC AUTO: 101.6 FL — HIGH (ref 80–100)
PLATELET # BLD AUTO: 122 K/UL — LOW (ref 150–400)
POTASSIUM SERPL-MCNC: 4.4 MMOL/L — SIGNIFICANT CHANGE UP (ref 3.5–5.3)
POTASSIUM SERPL-SCNC: 4.4 MMOL/L — SIGNIFICANT CHANGE UP (ref 3.5–5.3)
RBC # BLD: 3.19 M/UL — LOW (ref 3.8–5.2)
RBC # FLD: 12.5 % — SIGNIFICANT CHANGE UP (ref 10.3–14.5)
SODIUM SERPL-SCNC: 141 MMOL/L — SIGNIFICANT CHANGE UP (ref 135–145)
TIBC SERPL-MCNC: 173 UG/DL — LOW (ref 220–430)
UIBC SERPL-MCNC: 155 UG/DL — SIGNIFICANT CHANGE UP (ref 110–370)
WBC # BLD: 2.1 K/UL — LOW (ref 3.8–10.5)
WBC # FLD AUTO: 2.1 K/UL — LOW (ref 3.8–10.5)

## 2017-04-12 RX ORDER — FERROUS SULFATE 325(65) MG
325 TABLET ORAL DAILY
Qty: 0 | Refills: 0 | Status: DISCONTINUED | OUTPATIENT
Start: 2017-04-12 | End: 2017-04-13

## 2017-04-12 RX ADMIN — Medication 50 MICROGRAM(S): at 05:24

## 2017-04-12 RX ADMIN — ESCITALOPRAM OXALATE 10 MILLIGRAM(S): 10 TABLET, FILM COATED ORAL at 11:59

## 2017-04-12 RX ADMIN — VALSARTAN 80 MILLIGRAM(S): 80 TABLET ORAL at 05:24

## 2017-04-12 RX ADMIN — Medication 12.5 MILLIGRAM(S): at 17:31

## 2017-04-12 RX ADMIN — Medication 650 MILLIGRAM(S): at 00:20

## 2017-04-12 RX ADMIN — Medication 1 TABLET(S): at 11:59

## 2017-04-12 RX ADMIN — Medication 81 MILLIGRAM(S): at 11:59

## 2017-04-12 RX ADMIN — LIDOCAINE 1 PATCH: 4 CREAM TOPICAL at 11:58

## 2017-04-12 RX ADMIN — Medication 3 MILLILITER(S): at 12:45

## 2017-04-12 RX ADMIN — LIDOCAINE 1 PATCH: 4 CREAM TOPICAL at 00:19

## 2017-04-12 RX ADMIN — Medication 12.5 MILLIGRAM(S): at 05:24

## 2017-04-12 RX ADMIN — SODIUM CHLORIDE 3 MILLILITER(S): 9 INJECTION INTRAMUSCULAR; INTRAVENOUS; SUBCUTANEOUS at 21:06

## 2017-04-12 RX ADMIN — Medication 3 MILLILITER(S): at 03:06

## 2017-04-12 RX ADMIN — Medication 650 MILLIGRAM(S): at 01:16

## 2017-04-12 RX ADMIN — Medication 3 MILLILITER(S): at 07:25

## 2017-04-12 RX ADMIN — SODIUM CHLORIDE 3 MILLILITER(S): 9 INJECTION INTRAMUSCULAR; INTRAVENOUS; SUBCUTANEOUS at 05:25

## 2017-04-12 RX ADMIN — Medication 650 MILLIGRAM(S): at 06:24

## 2017-04-12 RX ADMIN — ENOXAPARIN SODIUM 30 MILLIGRAM(S): 100 INJECTION SUBCUTANEOUS at 11:58

## 2017-04-12 RX ADMIN — Medication 3 MILLILITER(S): at 19:51

## 2017-04-12 RX ADMIN — Medication 650 MILLIGRAM(S): at 06:55

## 2017-04-12 RX ADMIN — Medication 325 MILLIGRAM(S): at 11:59

## 2017-04-12 RX ADMIN — LIDOCAINE 1 PATCH: 4 CREAM TOPICAL at 23:10

## 2017-04-12 RX ADMIN — SODIUM CHLORIDE 3 MILLILITER(S): 9 INJECTION INTRAMUSCULAR; INTRAVENOUS; SUBCUTANEOUS at 12:47

## 2017-04-12 NOTE — PROGRESS NOTE ADULT - SUBJECTIVE AND OBJECTIVE BOX
Chief Complaint: Fall    Interval Events: No events overnight.    Review of Systems:  General: No fevers, chills, weight loss or gain  Skin: No rashes, color changes  Cardiovascular: No chest pain, orthopnea  Respiratory: No shortness of breath, cough  Gastrointestinal: No nausea, abdominal pain  Genitourinary: No incontinence, pain with urination  Musculoskeletal: No pain, swelling, decreased range of motion  Neurological: No headache, weakness  Psychiatric: No depression, anxiety  Endocrine: No weight loss or gain, increased thirst  All other systems are comprehensively negative.    Physical Exam:  Vital Signs Last 24 Hrs  T(C): 36.4, Max: 36.8 (04-11 @ 23:39)  T(F): 97.5, Max: 98.2 (04-11 @ 23:39)  HR: 76 (68 - 91)  BP: 154/64 (149/79 - 154/64)  BP(mean): --  RR: 18 (14 - 18)  SpO2: 98% (92% - 98%)  General: NAD  Neck: No JVD, no carotid bruit  Lungs: CTAB  CV: RRR, nl S1/S2, no M/R/G  Abdomen: S/NT/ND, +BS  Extremities: No LE edema, no cyanosis    Labs:      04-12    141  |  106  |  23  ----------------------------<  164<H>  4.4   |  31  |  0.92    Ca    9.3      12 Apr 2017 06:55                          10.5   2.1   )-----------( 122      ( 12 Apr 2017 06:55 )             32.4

## 2017-04-12 NOTE — SWALLOW BEDSIDE ASSESSMENT ADULT - SWALLOW EVAL: RECOMMENDED FEEDING/EATING TECHNIQUES
oral hygiene/maintain upright posture during/after eating for 30 mins/allow for swallow between intakes/small sips/bites/check mouth frequently for oral residue/pocketing/position upright (90 degrees)/eat slowly

## 2017-04-12 NOTE — SWALLOW BEDSIDE ASSESSMENT ADULT - ASR SWALLOW ASPIRATION MONITOR
position upright (90Y)/cough/gurgly voice/upper respiratory infection/change of breathing pattern/oral hygiene/fever/pneumonia/throat clearing

## 2017-04-12 NOTE — SWALLOW BEDSIDE ASSESSMENT ADULT - COMMENTS
Pt alert, oriented, cooperative.  Pt's family reported dysphagia at home and difficulty when she eats too quickly.  Pt presents with oral dysphagia characterized by labored mastication, latent AP transport, timely swallow, no oral residue noted post swallow. No overt s/s of aspiration noted.

## 2017-04-13 ENCOUNTER — TRANSCRIPTION ENCOUNTER (OUTPATIENT)
Age: 82
End: 2017-04-13

## 2017-04-13 VITALS
HEART RATE: 81 BPM | OXYGEN SATURATION: 95 % | DIASTOLIC BLOOD PRESSURE: 97 MMHG | SYSTOLIC BLOOD PRESSURE: 174 MMHG | RESPIRATION RATE: 16 BRPM

## 2017-04-13 LAB
FERRITIN SERPL-MCNC: 180.8 NG/ML — HIGH (ref 15–150)
FOLATE SERPL-MCNC: 16.4 NG/ML — SIGNIFICANT CHANGE UP (ref 4.8–24.2)
HCT VFR BLD CALC: 30.7 % — LOW (ref 34.5–45)
HGB BLD-MCNC: 10.2 G/DL — LOW (ref 11.5–15.5)
MCHC RBC-ENTMCNC: 33.3 GM/DL — SIGNIFICANT CHANGE UP (ref 32–36)
MCHC RBC-ENTMCNC: 33.4 PG — SIGNIFICANT CHANGE UP (ref 27–34)
MCV RBC AUTO: 100.4 FL — HIGH (ref 80–100)
PLATELET # BLD AUTO: 141 K/UL — LOW (ref 150–400)
RBC # BLD: 3.06 M/UL — LOW (ref 3.8–5.2)
RBC # FLD: 12.3 % — SIGNIFICANT CHANGE UP (ref 10.3–14.5)
VIT B12 SERPL-MCNC: 617 PG/ML — SIGNIFICANT CHANGE UP (ref 243–894)
WBC # BLD: 2 K/UL — LOW (ref 3.8–10.5)
WBC # FLD AUTO: 2 K/UL — LOW (ref 3.8–10.5)

## 2017-04-13 PROCEDURE — 71045 X-RAY EXAM CHEST 1 VIEW: CPT

## 2017-04-13 PROCEDURE — 93005 ELECTROCARDIOGRAM TRACING: CPT

## 2017-04-13 PROCEDURE — 84436 ASSAY OF TOTAL THYROXINE: CPT

## 2017-04-13 PROCEDURE — 72170 X-RAY EXAM OF PELVIS: CPT

## 2017-04-13 PROCEDURE — 80048 BASIC METABOLIC PNL TOTAL CA: CPT

## 2017-04-13 PROCEDURE — 96372 THER/PROPH/DIAG INJ SC/IM: CPT

## 2017-04-13 PROCEDURE — 99285 EMERGENCY DEPT VISIT HI MDM: CPT | Mod: 25

## 2017-04-13 PROCEDURE — 84443 ASSAY THYROID STIM HORMONE: CPT

## 2017-04-13 PROCEDURE — 80053 COMPREHEN METABOLIC PANEL: CPT

## 2017-04-13 PROCEDURE — 80061 LIPID PANEL: CPT

## 2017-04-13 PROCEDURE — 84480 ASSAY TRIIODOTHYRONINE (T3): CPT

## 2017-04-13 PROCEDURE — 97116 GAIT TRAINING THERAPY: CPT

## 2017-04-13 PROCEDURE — 73502 X-RAY EXAM HIP UNI 2-3 VIEWS: CPT

## 2017-04-13 PROCEDURE — 85610 PROTHROMBIN TIME: CPT

## 2017-04-13 PROCEDURE — 97530 THERAPEUTIC ACTIVITIES: CPT

## 2017-04-13 PROCEDURE — 94640 AIRWAY INHALATION TREATMENT: CPT

## 2017-04-13 PROCEDURE — 85027 COMPLETE CBC AUTOMATED: CPT

## 2017-04-13 PROCEDURE — 82728 ASSAY OF FERRITIN: CPT

## 2017-04-13 PROCEDURE — 81001 URINALYSIS AUTO W/SCOPE: CPT

## 2017-04-13 PROCEDURE — 82607 VITAMIN B-12: CPT

## 2017-04-13 PROCEDURE — 97161 PT EVAL LOW COMPLEX 20 MIN: CPT

## 2017-04-13 PROCEDURE — 97110 THERAPEUTIC EXERCISES: CPT

## 2017-04-13 PROCEDURE — 85730 THROMBOPLASTIN TIME PARTIAL: CPT

## 2017-04-13 PROCEDURE — 82746 ASSAY OF FOLIC ACID SERUM: CPT

## 2017-04-13 PROCEDURE — 83550 IRON BINDING TEST: CPT

## 2017-04-13 PROCEDURE — 70450 CT HEAD/BRAIN W/O DYE: CPT

## 2017-04-13 RX ORDER — ENOXAPARIN SODIUM 100 MG/ML
30 INJECTION SUBCUTANEOUS
Qty: 0 | Refills: 0 | COMMUNITY
Start: 2017-04-13

## 2017-04-13 RX ORDER — LIDOCAINE 4 G/100G
1 CREAM TOPICAL
Qty: 0 | Refills: 0 | COMMUNITY
Start: 2017-04-13

## 2017-04-13 RX ORDER — IPRATROPIUM/ALBUTEROL SULFATE 18-103MCG
3 AEROSOL WITH ADAPTER (GRAM) INHALATION
Qty: 0 | Refills: 0 | COMMUNITY
Start: 2017-04-13

## 2017-04-13 RX ORDER — ACETAMINOPHEN 500 MG
2 TABLET ORAL
Qty: 0 | Refills: 0 | COMMUNITY
Start: 2017-04-13

## 2017-04-13 RX ADMIN — Medication 3 MILLILITER(S): at 00:43

## 2017-04-13 RX ADMIN — LIDOCAINE 1 PATCH: 4 CREAM TOPICAL at 11:13

## 2017-04-13 RX ADMIN — Medication 650 MILLIGRAM(S): at 11:19

## 2017-04-13 RX ADMIN — Medication 81 MILLIGRAM(S): at 11:13

## 2017-04-13 RX ADMIN — Medication 325 MILLIGRAM(S): at 11:02

## 2017-04-13 RX ADMIN — Medication 1 TABLET(S): at 11:13

## 2017-04-13 RX ADMIN — ESCITALOPRAM OXALATE 10 MILLIGRAM(S): 10 TABLET, FILM COATED ORAL at 11:02

## 2017-04-13 RX ADMIN — Medication 3 MILLILITER(S): at 07:46

## 2017-04-13 RX ADMIN — ENOXAPARIN SODIUM 30 MILLIGRAM(S): 100 INJECTION SUBCUTANEOUS at 11:02

## 2017-04-13 RX ADMIN — Medication 50 MICROGRAM(S): at 06:57

## 2017-04-13 RX ADMIN — Medication 12.5 MILLIGRAM(S): at 06:57

## 2017-04-13 RX ADMIN — VALSARTAN 80 MILLIGRAM(S): 80 TABLET ORAL at 06:57

## 2017-04-13 RX ADMIN — Medication 650 MILLIGRAM(S): at 11:49

## 2017-04-13 NOTE — PROGRESS NOTE ADULT - GASTROINTESTINAL DETAILS
bowel sounds normal/soft/nontender
soft/bowel sounds normal/nontender
bowel sounds normal/nontender/soft

## 2017-04-13 NOTE — PROGRESS NOTE ADULT - ASSESSMENT
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    Plan:  - Continue metoprolol 12.5 mg bid for SVT  - Continue valsartan 80 mg daily for HTN  - Discharge planning
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    Plan:  - Continue metoprolol 12.5 mg bid for SVT  - Continue valsartan 80 mg daily for HTN  - Discontinue telemetry  - Plan for rehab
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    Plan:  - Continue metoprolol 12.5 mg bid for SVT  - Continue valsartan 80 mg daily for HTN  - No aggressive measures for known severe AS  - Plan for rehab
89yoF h/o mod dementia, AS, HTN, SVT, recurrent falls,rec UTI, with syncope with SVT, was advised PPM on earlier admission and family refused , hyperthyroidism , L2 compression fracture.  Patient lives on her own, ambulates with walker, independent with ADLs and with great support from her daughter and 24 hrs AID   .  patient fell one day prior to admission came with  hip pain, no syncope no seizure had vomiting twice .had hip pain and was unable to ambulate so brought in to [facility] and found to have Left pubic body and superior left pubic acetabular junction fractures.  Mild displacement of the superior pubic ramus inferiorly.  CT head negative for CVA   no bowel, or urinary complaint  had ortho consult, advised weight bearing as tolerated , has orophryngeal dusphagia started on mechanically soft with thin liquids.. htn, hypothyroid stable.   stable to dc to rehab
89yoF h/o mod dementia, AS, HTN, SVT, recurrent falls,rec UTI, with syncope with SVT, was advised PPM on earlier admission and family refused , hyperthyroidism , L2 compression fracture.  Patient lives on her own, ambulates with walker, independent with ADLs and with great support from her daughter and 24 hrs AID   .  patient fell yesterday and having hip pain, no syncope no seizure had vomiting twice last evening,had hip pain and was unable to ambulate so brought in to [facility] ED and found to have HIP fracture and is being admitted for further care and ortho consult, called and awaiting f up.   no bowel, or urinary complaint  had ortho consult, advised weight bearing as tolerated and sdc plan, had choking on food will have speech swallow consult to r/o dysphagia, aspiration precautions meanwhile. azotemia, likely dehydration will give some iv hydration
89yoF h/o mod dementia, AS, HTN, SVT, recurrent falls,rec UTI, with syncope with SVT, was advised PPM on earlier admission and family refused , hyperthyroidism , L2 compression fracture.  Patient lives on her own, ambulates with walker, independent with ADLs and with great support from her daughter and 24 hrs AID   .  patient fell yesterday and having hip pain, no syncope no seizure had vomiting twice last evening,had hip pain and was unable to ambulate so brought in to [facility] ED and found to have Left pubic body and superior left pubic acetabular junction fractures.  Mild displacement of the superior pubic ramus inferiorly.     no bowel, or urinary complaint  had ortho consult, advised weight bearing as tolerated and sdc plan, had choking on food will have speech swallow consult to r/o dysphagia, aspiration precautions meanwhile. azotemia, likely dehydration will give some iv hydration

## 2017-04-13 NOTE — DISCHARGE NOTE ADULT - HOSPITAL COURSE
89yoF h/o mod dementia, AS, HTN, SVT, recurrent falls,rec UTI, with syncope with SVT, was advised PPM on earlier admission and family refused , hyperthyroidism , L2 compression fracture.  Patient lives on her own, ambulates with walker, independent with ADLs and with great support from her daughter and 24 hrs AID   .  patient fell one day prior to admission came with  hip pain, no syncope no seizure had vomiting twice .had hip pain and was unable to ambulate so brought in to [facility] and found to have left ft pubic body and superior left pubic acetabular junction fractures.  Mild displacement of the superior pubic ramus inferiorly.  no bowel, or urinary complaint  had ortho consult, advised weight bearing as tolerated , has orophryngeal dusphagia started on mechanically soft with thin liquids.. htn, hypothyroid stable.   stable to dc to rehab  .had anemia of ch disease , at admission had dehydration and MARI resolved with hydration  stable at DC

## 2017-04-13 NOTE — DISCHARGE NOTE ADULT - PATIENT PORTAL LINK FT
“You can access the [facility] Patient Portal, offered by [facility], by registering with the following website: [email or web address]”

## 2017-04-13 NOTE — DISCHARGE NOTE ADULT - CARE PROVIDER_API CALL
Lewis Sy), Orthopaedic Surgery; Sports Medicine  [address] [place] [address]  Phone: [phone]  Fax: [phone]

## 2017-04-13 NOTE — DISCHARGE NOTE ADULT - MEDICATION SUMMARY - MEDICATIONS TO TAKE
I will START or STAY ON the medications listed below when I get home from the hospital:    acetaminophen 325 mg oral tablet  -- 2 tab(s) by mouth every 6 hours, As needed, Moderate Pain (4 - 6)  -- Indication: For pain    aspirin 81 mg oral tablet  -- 1 tab(s) by mouth once a day  -- Indication: For atherosclerosis    valsartan 80 mg oral tablet  -- 1 tab(s) by mouth 2 times a day  -- Indication: For Htn    enoxaparin  -- 30 milligram(s) subcutaneous once a day  -- Indication: For dvt pro    escitalopram 10 mg oral tablet  -- 1 tab(s) by mouth once a day  -- Indication: For anxiety    metoprolol  -- 12.5 milligram(s) by mouth 2 times a day  -- Indication: For Htn    Fosamax 70 mg oral tablet  -- 1 tab(s) by mouth once a week  -- Fridays  -- Indication: For op    albuterol-ipratropium 2.5 mg-0.5 mg/3 mL inhalation solution  -- 3 milliliter(s) inhaled every 6 hours, As Needed  -- Indication: For bronchospasm    lidocaine 5% topical film  -- Apply on skin to affected area once a day 12 hrs on 12 hrs off  -- Indication: For pain    ferrous sulfate 325 mg (65 mg elemental iron) oral delayed release tablet  -- 1 tab(s) by mouth once a day  -- Indication: For anemia    levothyroxine 50 mcg (0.05 mg) oral tablet  -- 1 tab(s) by mouth once a day  -- Indication: For Hypothyroid    calcium (as carbonate)-vitamin D 250 mg-125 intl units oral tablet  -- 1 tab(s) by mouth once a day  -- Indication: For Suppl

## 2017-04-13 NOTE — PROGRESS NOTE ADULT - SUBJECTIVE AND OBJECTIVE BOX
Chief Complaint: Fall    Interval Events: No events overnight.    Review of Systems:  General: No fevers, chills, weight loss or gain  Skin: No rashes, color changes  Cardiovascular: No chest pain, orthopnea  Respiratory: No shortness of breath, cough  Gastrointestinal: No nausea, abdominal pain  Genitourinary: No incontinence, pain with urination  Musculoskeletal: No pain, swelling, decreased range of motion  Neurological: No headache, weakness  Psychiatric: No depression, anxiety  Endocrine: No weight loss or gain, increased thirst  All other systems are comprehensively negative.    Physical Exam:  Vital Signs Last 24 Hrs  T(C): 36.4, Max: 36.4 (04-12 @ 23:01)  T(F): 97.5, Max: 97.6 (04-12 @ 23:01)  HR: 80 (70 - 109)  BP: 145/76 (145/76 - 170/88)  BP(mean): --  RR: 12 (12 - 18)  SpO2: 98% (95% - 100%)  General: NAD  Neck: No JVD, no carotid bruit  Lungs: CTAB  CV: RRR, nl S1/S2, no M/R/G  Abdomen: S/NT/ND, +BS  Extremities: No LE edema, no cyanosis    Labs:      04-12    141  |  106  |  23  ----------------------------<  164<H>  4.4   |  31  |  0.92    Ca    9.3      12 Apr 2017 06:55                          10.2   2.0   )-----------( 141      ( 13 Apr 2017 06:32 )             30.7

## 2017-04-13 NOTE — PROGRESS NOTE ADULT - NEUROLOGICAL DETAILS
cranial nerves intact/sensation intact/responds to verbal commands/deep reflexes intact
sensation intact/responds to pain/responds to verbal commands/deep reflexes intact

## 2017-04-13 NOTE — DISCHARGE NOTE ADULT - CARE PLAN
Principal Discharge DX:	Closed nondisplaced fracture of pelvis, unspecified part of pelvis, initial encounter  Goal:	stabilize and improve  Instructions for follow-up, activity and diet:	pain meds and PT  Secondary Diagnosis:	Early onset Alzheimer's disease with behavioral disturbance  Instructions for follow-up, activity and diet:	supportive care  Secondary Diagnosis:	Essential hypertension  Instructions for follow-up, activity and diet:	valsartan and metoprolol  Secondary Diagnosis:	History of hypothyroidism  Instructions for follow-up, activity and diet:	levothyroxin  Secondary Diagnosis:	SVT (supraventricular tachycardia)  Instructions for follow-up, activity and diet:	metoprolol  Secondary Diagnosis:	Alzheimer's dementia with behavioral disturbance, unspecified timing of dementia onset  Instructions for follow-up, activity and diet:	supportive care  Secondary Diagnosis:	Anemia, unspecified type  Instructions for follow-up, activity and diet:	feso4

## 2017-04-13 NOTE — PROGRESS NOTE ADULT - ATTENDING COMMENTS
I have discussed care plan with patient and HCP,expressed understanding of problems treatment and their effect and side effects, alternatives in detail,I have asked if they have any questions and concerns and appropriately addressed them to best of my ability  Reviewed all diagonostic tests, lab results and drug drug interactions, and medications  45 minutes spent on this visit, 50% visit time spent in care co-ordination with other attendings and counselling patient  d/w 
I have discussed care plan with patient and HCP,expressed understanding of problems treatment and their effect and side effects, alternatives in detail,I have asked if they have any questions and concerns and appropriately addressed them to best of my ability  Reviewed all diagonostic tests, lab results and drug drug interactions, and medications  60 minutes spent on this visit, 50% visit time spent in care co-ordination with other attendings and counselling patient  discharge paln executed
I have discussed care plan with patient and HCP,expressed understanding of problems treatment and their effect and side effects, alternatives in detail,I have asked if they have any questions and concerns and appropriately addressed them to best of my ability  Reviewed all diagonostic tests, lab results and drug drug interactions, and medications  45 minutes spent on this visit, 50% visit time spent in care co-ordination with other attendings and counselling patient  d/w

## 2017-04-13 NOTE — DISCHARGE NOTE ADULT - PLAN OF CARE
stabilize and improve pain meds and PT supportive care valsartan and metoprolol levothyroxin metoprolol feso4

## 2017-04-13 NOTE — DISCHARGE NOTE ADULT - SECONDARY DIAGNOSIS.
Early onset Alzheimer's disease with behavioral disturbance Essential hypertension History of hypothyroidism SVT (supraventricular tachycardia) Alzheimer's dementia with behavioral disturbance, unspecified timing of dementia onset Anemia, unspecified type

## 2017-04-13 NOTE — PROGRESS NOTE ADULT - SUBJECTIVE AND OBJECTIVE BOX
Patient is a 89y old  Female who presents with a chief complaint of fell at home fx left pelvis (13 Apr 2017 09:05)  s/p ORIF      INTERVAL HPI/OVERNIGHT EVENTS:    MEDICATIONS  (STANDING):  sodium chloride 0.9% lock flush 3milliLiter(s) IV Push every 8 hours  enoxaparin Injectable 30milliGRAM(s) SubCutaneous daily  aspirin  chewable 81milliGRAM(s) Oral daily  valsartan 80milliGRAM(s) Oral daily  escitalopram 10milliGRAM(s) Oral daily  metoprolol 12.5milliGRAM(s) Oral two times a day  levothyroxine 50MICROGram(s) Oral daily  calcium carbonate  625 mG + Vitamin D (OsCal 250 + D) 1Tablet(s) Oral daily  lidocaine   Patch 1Patch Transdermal daily  ALBUTerol/ipratropium for Nebulization 3milliLiter(s) Nebulizer every 6 hours  tiotropium 18 MICROgram(s) Capsule 1Capsule(s) Inhalation daily  ferrous    sulfate 325milliGRAM(s) Oral daily    MEDICATIONS  (PRN):  acetaminophen   Tablet. 650milliGRAM(s) Oral every 6 hours PRN Moderate Pain (4 - 6)  morphine  - Injectable 2milliGRAM(s) IV Push every 6 hours PRN Severe Pain (7 - 10)  ALBUTerol    90 MICROgram(s) HFA Inhaler 1Puff(s) Inhalation every 4 hours PRN Bronchospasm      Allergies    Allergy Status Unknown    Intolerances          Vital Signs Last 24 Hrs  T(C): 36.4, Max: 36.4 (04-12 @ 23:01)  T(F): 97.5, Max: 97.6 (04-12 @ 23:01)  HR: 80 (70 - 109)  BP: 145/76 (145/76 - 170/88)  BP(mean): --  RR: 12 (12 - 18)  SpO2: 98% (95% - 100%)    LABS:                        10.2   2.0   )-----------( 141      ( 13 Apr 2017 06:32 )             30.7     04-12    141  |  106  |  23  ----------------------------<  164<H>  4.4   |  31  |  0.92    Ca    9.3      12 Apr 2017 06:55          CAPILLARY BLOOD GLUCOSE      RADIOLOGY & ADDITIONAL TESTS:  IMPRESSION:  Left pubic body and superior left pubic acetabular junction fractures.  Mild displacement of the superior pubic ramus inferiorly.    Imaging Personally Reviewed:  [x ] YES  [ ] NO    Consultant(s) Notes Reviewed:  [x ] YES  [ ] NO    Care Discussed with Consultants/Other Providers [ x] YES  [ ] NO

## 2017-04-13 NOTE — PROGRESS NOTE ADULT - RS GEN PE MLT RESP DETAILS PC
clear to auscultation bilaterally/breath sounds equal
clear to auscultation bilaterally/airway patent
rhonchi/respirations non-labored/airway patent

## 2017-06-22 NOTE — ED PROVIDER NOTE - TEMPLATE, MLM
Rogelio Reid  : [facility] [address] [facility]  [place], [place], [address] [facility]  [phone]   [phone]          OUTPATIENT PHYSICAL THERAPY:Daily Note and Progress Report 2017    ICD-10: Treatment Diagnosis: I 89.0 lymphedema not elsewhere classified  Precautions/Allergies:   Review of patient's allergies indicates no known allergies. Fall Risk Score: 1 (? 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 Helen Elena presents for a lymphedema assessment due to edema of bilateral lower extremities. She has pitting edema in the bilateral lower extremities. She has previously had short stretch bandaging and MLD following knee surgery 3 years ago. She would like to try this again even though this is not orthopedic post surgical swelling. She was diagnosed with lymphoma in March of this year. She recently completed her second chemo of 6 planned. She will have a PET scan 17. Progress with chemo has been limited due to lab values being abnormal.  She is uncomfortable due to the edema. She will have a paracentesis 17. We will initiate edema management and progress slowly dependent on her tolerance to compression and her response to treatment. PROBLEM LIST (Impacting functional limitations):  1. Decreased Strength  2. Increased Pain  3. Decreased Activity Tolerance  4. Increased Fatigue  5. Increased Shortness of Breath  6. Decreased Flexibility/Joint Mobility  7. Edema/Girth  8. Decreased Knowledge of Precautions  9. Decreased [place] with Home Exercise Program INTERVENTIONS PLANNED:  1. Decongestion Therapy  2.  Home Exercise Program (HEP)  3. Range of Motion (ROM)  4. Therapeutic Exercise/Strengthening   TREATMENT PLAN:  Effective Dates: 5/24/17 TO 8/16/17. Frequency/Duration: 2 times a week for 12 weeks  Patient will return 7/5/17. GOALS: (Goals have been discussed and agreed upon with patient.)  Short-Term Functional Goals: Time Frame: 4 weeks  1. The patient will have knowledge of signs and symptoms of lymphedema and how to manage within 4 weeks. Met   2. The patient will tolerate short stretch bandaging of bilateral lower extremities for reduction of girth within 4 weeks. Met   3. The patient and caregiver will be independent with compression bandaging within 4 weeks. Met   Discharge Goals: Time Frame: 8 weeks  1. The patient will have a girth decrease of at least 5 cm in the calf within 8 weeks. 2. The patient will be fit with static compression garments within 8 weeks. 3. The patient and caregiver will be independent with edema management within 8 weeks. Rehabilitation Potential For Stated Goals: [address] [place] [address] therapy, I certify that the treatment plan above will be carried out by a therapist or under their direction. Thank you for this referral,  Angella Villalta PT     Referring Physician Signature: Ariela Fajardo MD              Date                    The information in this section was collected on 5/24/17 (except where otherwise noted). HISTORY:   History of Present Injury/Illness (Reason for Referral):  Lymphoma, ascites, bilateral lower extremity pitting edema  Past Medical History/Comorbidities:   Ms. Debora Hughes  has a past medical history of Anemia; Arthritis; Basal cell carcinoma; Chronic pain; Hypertension (10/4/2011); Morbid obesity ([address] Murmur; Non Hodgkin's lymphoma ([address] (3/30/2017); Other ill-defined conditions; Overactive bladder; Rheumatic fever; Shingles; Thromboembolus ([address] (x2); Thyroid disease; and Unspecified adverse effect of anesthesia.  She also has no past medical history of Aneurysm ([address] Arrhythmia; Asthma; Autoimmune disease ([address] CAD (coronary artery disease); Chronic kidney disease; Coagulation defects; COPD; Diabetes ([address] Difficult intubation; GERD (gastroesophageal reflux disease); Heart failure ([address] Liver disease; Malignant hyperthermia due to anesthesia; Nausea & vomiting; Pseudocholinesterase deficiency; Psychiatric disorder; PUD (peptic ulcer disease); Seizures ([facility] Stroke [facility]); or Unspecified sleep apnea. Ms. Deng Salas  has a past surgical history that includes  cholecystectomy fnc41; anesth,achilles tendon surg (2/10/2011); cholecystectomy (4039); orthopaedic (2012); orthopaedic (2013); and vascular access. Past Medical History:   Diagnosis Date    Anemia     in high school, \"received gamma globulin twice a week for awhile\"    Arthritis     osteo-r knee    Basal cell carcinoma     Followed by Dermatology    Chronic pain     KNEEs    Hypertension 10/4/2011    medication    Morbid obesity ([facility]     Murmur     \"had it my whole life\", did not appreciate murmur 9/12/2011 during assessment    Non Hodgkin's lymphoma ([address] 3/30/2017    Other ill-defined conditions     skin bumps-med as needed    Overactive bladder     Rheumatic fever     Possibly as a child    Shingles     Thromboembolus ([address] x2    LLE-calf-1990?-only took ASA-resolved in less than a wk-\"a little burned area-not examined\"    Thyroid disease     hypo-medication    Unspecified adverse effect of anesthesia     pt reports waking several times with knee surgery-spinal     Past Surgical History:   Procedure Laterality Date    [facility] CHOLECYSTECTOMY FNC41      HX CHOLECYSTECTOMY  1983    HX ORTHOPAEDIC  2012    right TKA    HX ORTHOPAEDIC  2013    left TKA. Dr. Lukasz Arrington.  HX VASCULAR ACCESS      [place] ANESTH,ACHILLES TENDON SURG  2/10/2011    LEFT. Dr. Joseph Ceja.         Social History/Living Environment:     lives with family, 2 flights of stairs  Prior Level of Function/Work/Activity:    Dominant Side: RIGHT  Current Medications:       Current Outpatient Prescriptions:     benzonatate (TESSALON PERLES) 100 mg capsule, Take 1 Cap by mouth every six (6) hours as needed for Cough. , Disp: 40 Cap, Rfl: 3    pregabalin (LYRICA) 75 mg capsule, Take 1 Cap by mouth three (3) times daily. Max Daily Amount: 225 mg., Disp: 90 Cap, Rfl: 2    levothyroxine (SYNTHROID) 125 mcg tablet, TAKE 1 TABLET DAILY BEFORE BREAKFAST, Disp: 90 Tab, Rfl: 0    HYDROcodone-homatropine (HYCODAN) 5-1.5 mg/5 mL (5 mL) syrup, Take 5 mL by mouth four (4) times daily as needed. Max Daily Amount: 20 mL., Disp: 400 mL, Rfl: 0    midodrine (PROAMITINE) 2.5 mg tablet, Take 1 Tab by mouth two (2) times daily (with meals) for 30 days. , Disp: 60 Tab, Rfl: 0    magnesium oxide (MAG-OX) 400 mg tablet, Take 1 Tab by mouth two (2) times a day., Disp: 60 Tab, Rfl: 1    hydrOXYzine HCl (ATARAX) 25 mg tablet, Take 1 Tab by mouth every six (6) hours as needed for Itching., Disp: 30 Tab, Rfl: 1    magic mouthwash (ALEXSANDER) susp, Take 10 mL by mouth every four (4) hours as needed. , Disp: 1 Bottle, Rfl: 2    citalopram (CELEXA) 20 mg tablet, Take 1 Tab by mouth daily. , Disp: 30 Tab, Rfl: 0    allopurinol (ZYLOPRIM) 300 mg tablet, Take 1 Tab by mouth two (2) times a day., Disp: 90 Tab, Rfl: 2    nystatin (MYCOSTATIN) powder, Apply  to affected area three (3) times daily. , Disp: 60 g, Rfl: 2    lidocaine-prilocaine (EMLA) topical cream, Apply  to affected area as needed for Pain. Apply to port site 45-60 minutes prior to lab appt or infusion. , Disp: 30 g, Rfl: 0    HYDROcodone-acetaminophen (NORCO) 5-325 mg per tablet, Take 1-2 Tabs by mouth every four (4) hours as needed for Pain. Max Daily Amount: 12 Tabs., Disp: 120 Tab, Rfl: 0    promethazine (PHENERGAN) 25 mg tablet, Take 25 mg by mouth every six (6) hours as needed for Nausea.  Unsure of dose, Disp: , Rfl:     ondansetron hcl (ZOFRAN, AS HYDROCHLORIDE,) 4 mg tablet, Take 1 Tab by mouth every eight (8) hours as needed for Nausea., Disp: 60 Tab, Rfl: 3    aspirin 81 mg chewable tablet, Take 81 mg by mouth daily. , Disp: , Rfl:     omeprazole (PRILOSEC) 20 mg capsule, Take 1 Cap by mouth daily. , Disp: 90 Cap, Rfl: 3   Date Last Reviewed:  5/24/17   Number of Personal Factors/Comorbidities that affect the Plan of Care: 3+: HIGH COMPLEXITY   EXAMINATION:   Palpation:          Pitting edema, dry skin, loose skin  ROM:          Within functional limits. She previously has had bilateral knee replacements and an Achilles tendon repair on the left  Strength:          Grossly 4-/5 x 4 extremities  Functional Mobility:         Gait/Ambulation:  Independent with increased speed        Transfers: independent        Bed Mobility:  independent  Skin Integrity:          Intact, but dry. Edema/Girth:  pitting    Left Right    Initial Most Recent Initial Most Recent   Upper  Extremity           Lower  Extremity 5th Tuberosity (cm): (P) 26.5 (27.2)  Ankle (cm): (P) 27.5 (39.1)  Calf (cm): (P) 48.5  Mid Knee (cm): (P) 44  Thigh (cm): (P) 54.6   5th Tuberosity (cm): (P) 26.6 (27.1)  Ankle (cm): (P) 26.2 (37.5)  Calf (cm): (P) 48  Mid Knee (cm): (P) 42.9  Thigh (cm): (P) 52         Body Structures Involved:  1. Muscles  2. lymphatic system Body Functions Affected:  1. Sensory/Pain  2. Skin Related  3. lymphatic system Activities and Participation Affected:  1. General Tasks and Demands  2. Mobility  3. Self Care   Number of elements (examined above) that affect the Plan of Care: 4+: HIGH COMPLEXITY   CLINICAL PRESENTATION:   Presentation: Evolving clinical presentation with unstable and unpredictable characteristics: HIGH COMPLEXITY   CLINICAL DECISION MAKING:   Outcome Measure: Tool Used: NCCN Distress Thermometer   Score:  Initial:   Most Recent: X    Interpretation of Score: If greater than or equal to 8, then PHQ-9 Depression Scale Score   and JOON-7 Anxiety Scale Score  .   Tool Used: ECOG Performance Survey Score  Score:  Initial: 2 Most Recent:  1    Interpretation of Score:   0 Fully active, able to carry on all pre-disease performance without restriction   1 Restricted in physically strenuous activity but ambulatory and able to carry out work of a light or sedentary nature, e.g., light house work, office work   2 Ambulatory and capable of all selfcare but unable to carry out any work activities. Up and about more than 50% of waking hours   3 Capable of only limited selfcare, confined to bed or chair more than 50% of waking hours   4 Completely disabled. Cannot carry on any selfcare. Totally confined to bed or chair   5 Dead    Tool Used: 6-MINUTE WALK TEST  Score:  Initial: 1110 feet Most Recent: X feet (Date: -- )   Interpretation of Score: Normal range varies but is approximately 9329-3157 Feet      Distance walked: 1110 feet     Baseline End of Test   Heart Rate 72 100   Dyspnea (Luther Scale)     Fatigue (Luther Scale) 0 2   SpO2 97 99   /69 142/72     Score 2133 [id number] 0693-3630 [phone] 426-16 15-0   Modifier CH CI CJ CK CL CM CN       Tool Used: Timed Up and Go (TUG)  Score:  Initial: 8 seconds Most Recent: X seconds (Date: -- )   Interpretation of Score: The test measures, in seconds, the time taken by an individual to stand up from a standard arm chair (seat height 46 cm [18 in], arm height 65 cm [25.6 in]), walk a distance of 3 meters (118 in, approx 10 ft), turn, walk back to the chair and sit down. If the individual takes longer than 14 seconds to complete TUG, this indicates risk for falls. Score 7 7.5-10.5 11-14 14.5-17.5 18-21 21.5-24.5 25+   Modifier CH CI CJ CK CL CM CN         Tool Used: Lymphedema Life Impact Scale   Score:  Initial:  54 Most Recent: 48 (Date: 6/22/17 )   Interpretation of Score: The Lymphedema Life Impact Scale (LLIS) is a validated instrument that measures the physical, functional, and psychosocial concerns pertinent to patients with extremity lymphedema.   The Scale's questionnaire is administered to patients to gauge impairments, activity limitations, and participation restrictions resulting from their lymphedema. Score 0 1-13 14-26 27-40 41-54 55-67 68   Modifier CH CI CJ CK CL CM CN       Medical Necessity:   · Patient is expected to demonstrate progress in strength and edema management to increase independence with self care and houseold activity. Reason for Services/Other Comments:  · Patient continues to demonstrate capacity to improve strength and edema management which will increase independence. Use of outcome tool(s) and clinical judgement create a POC that gives a: Questionable prediction of patient's progress: MODERATE COMPLEXITY            TREATMENT:   (In addition to Assessment/Re-Assessment sessions the following treatments were rendered)  Pre-treatment Symptoms/Complaints:  Bilateral lower extremity edema. The patient reports she would like to be more active. Pain: Initial: o      Post Session:  0   70 min  Bandages not in place upon arrival.  Skin intact, but dry. Reviewed need for compression and benefits of static garments. Applied lotion bilaterally. Short stretch bandage applied to left lower extremities toes to popliteal crease using stockinette, transelast, cotton padding, 6 cm, 8 cm and 2 10 cm short stretch bandages. Sit to stand x 10 reps  6 minute walk  TUG  Counter push ups x 10 reps  Step ups x 10 reps on low step  To perform exercises at home twice daily  LLIS improved. as above    Treatment/Session Assessment:    · Response to Treatment:  Tolerated the treatment well. · Compliance with Program/Exercises: Will assess as treatment progresses. · Recommendations/Intent for next treatment session: \"Next visit will focus on assess efficacy of compression\". Continue with compression bandaging to tolerance. Will see her 7/5/17. Will initiate conditioning and strengthening.   Total Treatment Duration:  PT Patient Time In/Time Out  Time In: 0800  Time Out: 0910    Fanny Machado, PT Fall

## 2018-07-31 ENCOUNTER — EMERGENCY (EMERGENCY)
Facility: HOSPITAL | Age: 83
LOS: 1 days | Discharge: HOME | End: 2018-07-31
Attending: EMERGENCY MEDICINE | Admitting: EMERGENCY MEDICINE
Payer: MEDICARE

## 2018-07-31 VITALS
SYSTOLIC BLOOD PRESSURE: 193 MMHG | OXYGEN SATURATION: 93 % | HEART RATE: 65 BPM | RESPIRATION RATE: 16 BRPM | DIASTOLIC BLOOD PRESSURE: 74 MMHG | HEIGHT: 62 IN | WEIGHT: 130.07 LBS | TEMPERATURE: 98 F

## 2018-07-31 VITALS
TEMPERATURE: 98 F | DIASTOLIC BLOOD PRESSURE: 94 MMHG | RESPIRATION RATE: 16 BRPM | SYSTOLIC BLOOD PRESSURE: 180 MMHG | HEART RATE: 74 BPM | OXYGEN SATURATION: 95 %

## 2018-07-31 DIAGNOSIS — Z98.89 OTHER SPECIFIED POSTPROCEDURAL STATES: Chronic | ICD-10-CM

## 2018-07-31 PROBLEM — S32.020A WEDGE COMPRESSION FRACTURE OF SECOND LUMBAR VERTEBRA, INITIAL ENCOUNTER FOR CLOSED FRACTURE: Chronic | Status: ACTIVE | Noted: 2017-04-10

## 2018-07-31 PROBLEM — I47.1 SUPRAVENTRICULAR TACHYCARDIA: Chronic | Status: ACTIVE | Noted: 2017-04-10

## 2018-07-31 PROCEDURE — 73030 X-RAY EXAM OF SHOULDER: CPT | Mod: 26,RT

## 2018-07-31 PROCEDURE — 73080 X-RAY EXAM OF ELBOW: CPT

## 2018-07-31 PROCEDURE — 71045 X-RAY EXAM CHEST 1 VIEW: CPT | Mod: 26

## 2018-07-31 PROCEDURE — 70160 X-RAY EXAM OF NASAL BONES: CPT

## 2018-07-31 PROCEDURE — 99284 EMERGENCY DEPT VISIT MOD MDM: CPT

## 2018-07-31 PROCEDURE — 99284 EMERGENCY DEPT VISIT MOD MDM: CPT | Mod: 25

## 2018-07-31 PROCEDURE — 73080 X-RAY EXAM OF ELBOW: CPT | Mod: 26,RT

## 2018-07-31 PROCEDURE — 73030 X-RAY EXAM OF SHOULDER: CPT

## 2018-07-31 PROCEDURE — 71045 X-RAY EXAM CHEST 1 VIEW: CPT

## 2018-07-31 PROCEDURE — 70160 X-RAY EXAM OF NASAL BONES: CPT | Mod: 26

## 2018-07-31 RX ORDER — FERROUS SULFATE 325(65) MG
1 TABLET ORAL
Qty: 0 | Refills: 0 | COMMUNITY

## 2018-07-31 NOTE — ED ADULT NURSE NOTE - NSIMPLEMENTINTERV_GEN_ALL_ED
Implemented All Fall with Harm Risk Interventions:  [place] to call system. Call bell, personal items and telephone within reach. Instruct patient to call for assistance. Room bathroom lighting operational. Non-slip footwear when patient is off stretcher. Physically safe environment: no spills, clutter or unnecessary equipment. Stretcher in lowest position, wheels locked, appropriate side rails in place. Provide visual cue, wrist band, yellow gown, etc. Monitor gait and stability. Monitor for mental status changes and reorient to person, place, and time. Review medications for side effects contributing to fall risk. Reinforce activity limits and safety measures with patient and family. Provide visual clues: red socks.

## 2018-07-31 NOTE — ED PROVIDER NOTE - OBJECTIVE STATEMENT
89 y/o F pt with history of alzheimer's, macular degeneration, dementia, hypothyroidism presents to the ED BIBA from home s/p fall. Per pt's daughter, pt got up without the help of her aide and fell forward. Pt is c/o nasal bridge pain, R arm pain and R ribcage pain. Pt only takes aspirin at night, no other anticoagulants. No LOC. No nausea, vomiting. History is limited due to pt's condition.  PMD- Dr. Clements

## 2018-07-31 NOTE — ED PROVIDER NOTE - CARE PLAN
Principal Discharge DX:	Elbow contusion  Secondary Diagnosis:	Shoulder sprain  Secondary Diagnosis:	Nasal contusion

## 2018-07-31 NOTE — ED PROVIDER NOTE - CRANIAL NERVE AND PUPILLARY EXAM
tongue is midline/central vision intact/peripheral vision intact/cranial nerves 2-12 intact/extra-ocular movements intact

## 2018-07-31 NOTE — ED PROVIDER NOTE - ENMT, MLM
+BRUISING TO NASAL BRIDGE. Airway patent, Nasal mucosa clear. Mouth with normal mucosa. Throat has no vesicles, no oropharyngeal exudates and uvula is midline.

## 2018-07-31 NOTE — ED PROVIDER NOTE - MEDICAL DECISION MAKING DETAILS
89 yo female fell off couch today co pain in nose and rt shoulder and elbow. PE reveals abrasion contusion rt elbow. Otherwise nonfocal. Plan - xrays.

## 2018-07-31 NOTE — ED PROVIDER NOTE - PSH
Cataract of right eye    H/O inguinal hernia repair    H/O umbilical hernia repair    H/O umbilical hernia repair  six years ago

## 2018-07-31 NOTE — ED PROVIDER NOTE - CONSTITUTIONAL, MLM
normal... Well appearing, PLEASANTLY CONFUSED AT BASELINE, well nourished, awake, alert, oriented to person, place, time/situation and in no apparent distress.

## 2018-07-31 NOTE — ED PROVIDER NOTE - SKIN, MLM
+SUPERFICIAL ABRASION R ELBOW. Skin normal color for race, warm, dry and intact. No evidence of rash.

## 2019-06-25 NOTE — PATIENT PROFILE ADULT. - MEDICATIONS BROUGHT TO HOSPITAL, PROFILE
no PAST SURGICAL HISTORY:  H/O intestinal obstruction s/p resection    H/O lumpectomy right    History of hip fracture s/p repair right ( 1993)    S/P cardiac catheterization ESTEPHANIA x 3 stent (10/25/2016)

## 2022-07-07 NOTE — ED PROVIDER NOTE - PRINCIPAL DIAGNOSIS
- Persistent mild tachycardia to 100s. Became hypotensive to 79/62 overnight, recheck 83/64, no fluids given. Pt asymptomatic  - Pressures soft throughout admission. Received 1 L LR, 1 L NS in the ED on arrival  - continue cbc q12, monitor for bleeding #Hypokalemia  - K 3.2 on admission, 2.8 on arrival to ICU  - Repleted 60 mg total IV  - Replete as needed  - Mg 0.9 on arrival. Repleted 4 g Elbow contusion

## 2023-05-25 NOTE — ED ADULT TRIAGE NOTE - CADM TRG EMS AGENCY
Shayna CASEY Myahchart Default Message Pool (supporting Maddison Ramirez, [facility]) 14 hours ago (4:17 PM)       That’s ok thank you for letting me know! Yes I would like to go forward with dupixent, as it seems like it much be mostly environmentally driven since my diet is already very limited and is all whole organic foods, I’m not sure what else it could even be from at this point.      [facility]

## 2023-07-26 NOTE — H&P ADULT - PROBLEM SELECTOR PLAN 3
levothyroxin tft Cimzia Counseling:  I discussed with the patient the risks of Cimzia including but not limited to immunosuppression, allergic reactions and infections.  The patient understands that monitoring is required including a PPD at baseline and must alert us or the primary physician if symptoms of infection or other concerning signs are noted.

## 2024-02-17 NOTE — H&P ADULT - [facility] PE MLT D E PC
Mahi is resting at this time with no s/s or c/o pain. She has supplemental O2 in place via nc; have reduced O2 flow x2 from 4L to 2L. She is tolerating dose adjustment well. She has crackles in mid R lung lobe, but she denies SOB. She has pre-existing swelling to R hand from infiltrated IV at another facility. Bruising to BUE noted. Glucose levels WNL at HS. Pt has call light in reach and is using appropriately; safety maintained.    Problem: Respiratory - Adult  Goal: Achieves optimal ventilation and oxygenation  2/16/2024 2342 by KAREEM RIVAS  Outcome: Progressing  Flowsheets (Taken 2/16/2024 2000)  Achieves optimal ventilation and oxygenation:   Assess for changes in respiratory status   Assess for changes in mentation and behavior   Position to facilitate oxygenation and minimize respiratory effort   Oxygen supplementation based on oxygen saturation or arterial blood gases   Respiratory therapy support as indicated     Problem: Skin/Tissue Integrity - Adult  Goal: Skin integrity remains intact  2/16/2024 2342 by KAREEM RIVAS  Outcome: Progressing  Flowsheets (Taken 2/16/2024 2000)  Skin Integrity Remains Intact: Monitor for areas of redness and/or skin breakdown     Problem: Musculoskeletal - Adult  Goal: Return mobility to safest level of function  2/16/2024 2342 by KAREEM RIVAS  Outcome: Progressing  Flowsheets (Taken 2/16/2024 2000)  Return Mobility to Safest Level of Function:   Assess patient stability and activity tolerance for standing, transferring and ambulating with or without assistive devices   Assist with transfers and ambulation using safe patient handling equipment as needed   Ensure adequate protection for wounds/incisions during mobilization   Instruct patient/family in ordered activity level     Problem: Gastrointestinal - Adult  Goal: Minimal or absence of nausea and vomiting  Outcome: Progressing  Flowsheets (Taken 2/16/2024 2000)  Minimal or absence of nausea and vomiting: (Denies n/v)  regular rate and rhythm